# Patient Record
Sex: FEMALE | Race: WHITE
[De-identification: names, ages, dates, MRNs, and addresses within clinical notes are randomized per-mention and may not be internally consistent; named-entity substitution may affect disease eponyms.]

---

## 2018-01-09 ENCOUNTER — HOSPITAL ENCOUNTER (EMERGENCY)
Dept: HOSPITAL 89 - ER | Age: 58
Discharge: HOME | End: 2018-01-09
Payer: COMMERCIAL

## 2018-01-09 VITALS — SYSTOLIC BLOOD PRESSURE: 118 MMHG | DIASTOLIC BLOOD PRESSURE: 68 MMHG

## 2018-01-09 VITALS — HEIGHT: 63 IN | WEIGHT: 145 LBS | BODY MASS INDEX: 25.69 KG/M2

## 2018-01-09 DIAGNOSIS — R09.02: ICD-10-CM

## 2018-01-09 DIAGNOSIS — J18.9: Primary | ICD-10-CM

## 2018-01-09 LAB — PLATELET COUNT, AUTOMATED: 413 K/UL (ref 150–450)

## 2018-01-09 PROCEDURE — 82947 ASSAY GLUCOSE BLOOD QUANT: CPT

## 2018-01-09 PROCEDURE — 36415 COLL VENOUS BLD VENIPUNCTURE: CPT

## 2018-01-09 PROCEDURE — 71275 CT ANGIOGRAPHY CHEST: CPT

## 2018-01-09 PROCEDURE — 93005 ELECTROCARDIOGRAM TRACING: CPT

## 2018-01-09 PROCEDURE — 84450 TRANSFERASE (AST) (SGOT): CPT

## 2018-01-09 PROCEDURE — 83880 ASSAY OF NATRIURETIC PEPTIDE: CPT

## 2018-01-09 PROCEDURE — 82565 ASSAY OF CREATININE: CPT

## 2018-01-09 PROCEDURE — 84295 ASSAY OF SERUM SODIUM: CPT

## 2018-01-09 PROCEDURE — 82247 BILIRUBIN TOTAL: CPT

## 2018-01-09 PROCEDURE — 85379 FIBRIN DEGRADATION QUANT: CPT

## 2018-01-09 PROCEDURE — 81001 URINALYSIS AUTO W/SCOPE: CPT

## 2018-01-09 PROCEDURE — 82374 ASSAY BLOOD CARBON DIOXIDE: CPT

## 2018-01-09 PROCEDURE — 82040 ASSAY OF SERUM ALBUMIN: CPT

## 2018-01-09 PROCEDURE — 94640 AIRWAY INHALATION TREATMENT: CPT

## 2018-01-09 PROCEDURE — 84484 ASSAY OF TROPONIN QUANT: CPT

## 2018-01-09 PROCEDURE — 85025 COMPLETE CBC W/AUTO DIFF WBC: CPT

## 2018-01-09 PROCEDURE — 84075 ASSAY ALKALINE PHOSPHATASE: CPT

## 2018-01-09 PROCEDURE — 87040 BLOOD CULTURE FOR BACTERIA: CPT

## 2018-01-09 PROCEDURE — 99284 EMERGENCY DEPT VISIT MOD MDM: CPT

## 2018-01-09 PROCEDURE — 84155 ASSAY OF PROTEIN SERUM: CPT

## 2018-01-09 PROCEDURE — 71046 X-RAY EXAM CHEST 2 VIEWS: CPT

## 2018-01-09 PROCEDURE — 82310 ASSAY OF CALCIUM: CPT

## 2018-01-09 PROCEDURE — 84132 ASSAY OF SERUM POTASSIUM: CPT

## 2018-01-09 PROCEDURE — 87502 INFLUENZA DNA AMP PROBE: CPT

## 2018-01-09 PROCEDURE — 82435 ASSAY OF BLOOD CHLORIDE: CPT

## 2018-01-09 PROCEDURE — 84460 ALANINE AMINO (ALT) (SGPT): CPT

## 2018-01-09 PROCEDURE — 84520 ASSAY OF UREA NITROGEN: CPT

## 2018-01-09 NOTE — ER REPORT
History and Physical


Time Seen By MD:  06:59


Hx. of Stated Complaint:  


PT HAS HAD ONSET OF DYSPNEA SINCE THE WEEKEND, NON PROD COUGH, NO RESP HX. PAIN 


IN "BRONCHIAL TUBES " WITH COUGH


HPI/ROS


CHIEF COMPLAINT: short of breath, hypoxia





HISTORY OF PRESENT ILLNESS: This is a 57 year old female. She has been having 

symptoms of shortness of breath for several days. Very fatigued as well. Has 

been using her sister's oxygen with her CPAP at night. When she takes of the 

CPAP, she feels worse, but while on the CPAP has felt better. Short of breath, 

especially with exertion. Has non-productive cough. Has pain in the upper chest

, what she describes as from her bronchial tubes. Worsens with cough. 

Subjective fevers. Mild sore throat and congestions and drainage. No other 

chest pain. No nausea or vomiting. No problems with bowel or bladder. Has some 

muscle aches. No rashes. No swelling in extremities at this time. Has had prior 

symptoms like this in the past requiring oxygen use, but workup was negative 

including stress tests, cardiac ultrasound. She has been seeing a pulmonologist 

since then.





REVIEW OF SYSTEMS:


As above.


Allergies:  


Coded Allergies:  


     Sulfa (Sulfonamide Antibiotics) (Verified  Adverse Reaction, Unknown, 

ITCHING, 1/9/18)


Home Meds


Active Scripts


Azithromycin (ZITHROMAX) 250 Mg Tablet, 2 TAB PO AS DIRECTED for 5 Days, #6 TAB 

0 Refills


   Take 2 tablets today and then one daily for 4 more days.


   Prov:CRISTIAN RAYMUNDO MD         1/9/18


Reported Medications


Amoxicillin/Pot Clav 875-125 Mg Tab (AUGMENTIN 875-125 TABLET) 1 Each Tablet, 1 

TAB PO Q12H, TAB


   1/9/18


Omeprazole (OMEPRAZOLE) 20 Mg Capsule.dr, 1 CAP PO QDAY, CAP


   1/9/18


Omega-3 Fatty Acids (FISH OIL) 500 Mg Capsule, 500 MG PO QDAY


   9/5/13


Multivitamin With Minerals (MULTIPLE VITAMIN) 1 Each Tablet, 1 EACH PO QDAY


   9/5/13


Past Medical/Surgical History


Obstructive sleep apnea, pulmonary history as noted above. Postmenopausal, 

orthopedic surgery on left great toe for bone spurs


Reviewed Nurses Notes:  Yes


Hx Substance Use Disorder:  No


Hx Alcohol Use:  Yes (10 WEEKLY)


Constitutional





Vital Sign - Last 24 Hours








 1/9/18 1/9/18 1/9/18 1/9/18





 06:43 06:43 06:45 07:00


 


Temp 97.5   


 


Pulse 115   


 


Resp 22   


 


B/P (MAP) 134/89 134/89 (104)  114/69 (84)


 


Pulse Ox 80   


 


O2 Delivery Room Air   


 


O2 Flow Rate   2.0 


 


    





 1/9/18 1/9/18 1/9/18 1/9/18





 07:10 07:11 07:11 07:16


 


Pulse 83  85 


 


Resp 22  16 


 


Pulse Ox 95 95  95


 


O2 Delivery  Nasal Cannula  Nasal Cannula


 


O2 Flow Rate  4.0  3.0





 1/9/18 1/9/18 1/9/18 1/9/18





 07:16 07:40 08:45 08:51


 


Pulse 98  77 


 


Resp 18 25  


 


B/P (MAP)    118/68 (85)


 


Pulse Ox   83 





 1/9/18 1/9/18 1/9/18 





 09:15 09:20 10:20 


 


Pulse 74 78 73 


 


Resp 24 20 24 


 


Pulse Ox 92 93 93 








Physical Exam


   General Appearance: The patient is alert. No acute distress. Non-toxic in 

appearance.


Eyes: Pupils are equal, round. No pallor, injection or icterus.


ENT: Mucous membranes are moist. Normal oral mucosa. Posterior oropharynx with 

some posterior drainage and mild erythema, but no exudates or hypertrophy. 

Normal tympanic membranes and canals.


Neck: Supple and non tender. No lymphadenopathy.


Respiratory: Lungs have course rhonchi, but no wheezing or rales. There are no 

retractions or accessory muscle use. hypoxia of 83% on room air, improves into 

the 90s on 2 liters of oxygen.


Cardiovascular: Regular rate and rhythm. No murmurs, gallops or rubs. Normal 

capillary refill.


Gastrointestinal:  Abdomen is soft and non tender. No masses or organomegaly. 

Normal active bowel sounds. No costovertebral angle tenderness with percussion.


Neurological: Alert and oriented x3.


Skin: Warm and dry. No rashes.


Musculoskeletal: Nontender. Full range of motion.





DIFFERENTIAL DIAGNOSIS: After history and physical exam, differential diagnosis 

was considered for shortness of breath including but not limited to pulmonary 

infectious process, COPD, asthma, pulmonary embolus and congestive heart 

failure.





Medical Decision Making


Data Points


Result Diagram:  


1/9/18 0650                                                                    

            1/9/18 0650





Laboratory





Hematology








Test


  1/9/18


06:50 1/9/18


07:20 1/9/18


07:58


 


Red Blood Count


  4.88 M/uL


(4.17-5.56) 


  


 


 


Mean Corpuscular Volume


  88.7 fL


(80.0-96.0) 


  


 


 


Mean Corpuscular Hemoglobin


  30.2 pg


(26.0-33.0) 


  


 


 


Mean Corpuscular Hemoglobin


Concent 34.0 g/dL


(32.0-36.0) 


  


 


 


Red Cell Distribution Width


  13.5 %


(11.5-14.5) 


  


 


 


Mean Platelet Volume


  7.3 fL


(7.2-11.1) 


  


 


 


Neutrophils (%) (Auto)


  68.2 %


(39.4-72.5) 


  


 


 


Lymphocytes (%) (Auto)


  23.2 %


(17.6-49.6) 


  


 


 


Monocytes (%) (Auto)


  6.4 %


(4.1-12.4) 


  


 


 


Eosinophils (%) (Auto)


  1.2 %


(0.4-6.7) 


  


 


 


Basophils (%) (Auto)


  1.0 %


(0.3-1.4) 


  


 


 


Nucleated RBC Relative Count


(auto) 0.0 /100WBC 


  


  


 


 


Neutrophils # (Auto)


  9.6 K/uL


(2.0-7.4) 


  


 


 


Lymphocytes # (Auto)


  3.3 K/uL


(1.3-3.6) 


  


 


 


Monocytes # (Auto)


  0.9 K/uL


(0.3-1.0) 


  


 


 


Eosinophils # (Auto)


  0.2 K/uL


(0.0-0.5) 


  


 


 


Basophils # (Auto)


  0.1 K/uL


(0.0-0.1) 


  


 


 


Nucleated RBC Absolute Count


(auto) 0.00 K/uL 


  


  


 


 


D-Dimer Quantitative (PE/DVT)


  5.68 ug/ml


(0-0.50) 


  


 


 


Sodium Level


  134 mmol/L


(137-145) 


  


 


 


Potassium Level


  3.7 mmol/L


(3.5-5.0) 


  


 


 


Chloride Level


  94 mmol/L


() 


  


 


 


Carbon Dioxide Level


  30 mmol/L


(22-31) 


  


 


 


Blood Urea Nitrogen


  10 mg/dl


(7-18) 


  


 


 


Creatinine


  0.80 mg/dl


(0.52-1.04) 


  


 


 


Glomerular Filtration Rate


Calc > 60.0 


  


  


 


 


Random Glucose


  109 mg/dl


() 


  


 


 


Calcium Level


  9.9 mg/dl


(8.4-10.2) 


  


 


 


Total Bilirubin


  0.7 mg/dl


(0.2-1.3) 


  


 


 


Aspartate Amino Transf


(AST/SGOT) 36 U/L (0-35) 


  


  


 


 


Alanine Aminotransferase


(ALT/SGPT) 34 U/L (0-56) 


  


  


 


 


Alkaline Phosphatase


  122 U/L


(0-126) 


  


 


 


Troponin I < 0.012 ng/ml   


 


B-Type Natriuretic Peptide


  35 pg/ml


(0-100) 


  


 


 


Total Protein


  8.5 gm/dl


(6.3-8.2) 


  


 


 


Albumin


  4.3 g/dl


(3.5-5.0) 


  


 


 


Influenza Type A Antigen


  


  Negative


(NEGATIVE) 


 


 


Influenza Type B Antigen


  


  Negative


(NEGATIVE) 


 


 


Urine Color   Straw 


 


Urine Clarity   Clear 


 


Urine pH


  


  


  7.0 pH


(4.8-9.5)


 


Urine Specific Gravity   1.004 


 


Urine Protein


  


  


  Negative mg/dL


(NEGATIVE)


 


Urine Glucose (UA)


  


  


  Negative mg/dL


(NEGATIVE)


 


Urine Ketones


  


  


  Negative mg/dL


(NEGATIVE)


 


Urine Blood


  


  


  Negative


(NEGATIVE)


 


Urine Nitrite


  


  


  Negative


(NEGATIVE)


 


Urine Bilirubin


  


  


  Negative


(NEGATIVE)


 


Urine Urobilinogen


  


  


  Negative mg/dL


(0.2-1.9)


 


Urine Leukocyte Esterase


  


  


  Negative


(NEGATIVE)


 


Urine RBC


  


  


  None /HPF


(0-2/HPF)


 


Urine WBC


  


  


  None /HPF


(0-5/HPF)


 


Urine Squamous Epithelial


Cells 


  


  Moderate /LPF


(</=FEW)


 


Urine Bacteria


  


  


  Negative /HPF


(NONE-FEW)


 


Urine Mucus


  


  


  None /HPF


(NONE-FEW)








Chemistry








Test


  1/9/18


06:50 1/9/18


07:20 1/9/18


07:58


 


White Blood Count


  14.1 k/uL


(4.5-11.0) 


  


 


 


Red Blood Count


  4.88 M/uL


(4.17-5.56) 


  


 


 


Hemoglobin


  14.7 g/dL


(12.0-16.0) 


  


 


 


Hematocrit


  43.2 %


(34.0-47.0) 


  


 


 


Mean Corpuscular Volume


  88.7 fL


(80.0-96.0) 


  


 


 


Mean Corpuscular Hemoglobin


  30.2 pg


(26.0-33.0) 


  


 


 


Mean Corpuscular Hemoglobin


Concent 34.0 g/dL


(32.0-36.0) 


  


 


 


Red Cell Distribution Width


  13.5 %


(11.5-14.5) 


  


 


 


Platelet Count


  413 K/uL


(150-450) 


  


 


 


Mean Platelet Volume


  7.3 fL


(7.2-11.1) 


  


 


 


Neutrophils (%) (Auto)


  68.2 %


(39.4-72.5) 


  


 


 


Lymphocytes (%) (Auto)


  23.2 %


(17.6-49.6) 


  


 


 


Monocytes (%) (Auto)


  6.4 %


(4.1-12.4) 


  


 


 


Eosinophils (%) (Auto)


  1.2 %


(0.4-6.7) 


  


 


 


Basophils (%) (Auto)


  1.0 %


(0.3-1.4) 


  


 


 


Nucleated RBC Relative Count


(auto) 0.0 /100WBC 


  


  


 


 


Neutrophils # (Auto)


  9.6 K/uL


(2.0-7.4) 


  


 


 


Lymphocytes # (Auto)


  3.3 K/uL


(1.3-3.6) 


  


 


 


Monocytes # (Auto)


  0.9 K/uL


(0.3-1.0) 


  


 


 


Eosinophils # (Auto)


  0.2 K/uL


(0.0-0.5) 


  


 


 


Basophils # (Auto)


  0.1 K/uL


(0.0-0.1) 


  


 


 


Nucleated RBC Absolute Count


(auto) 0.00 K/uL 


  


  


 


 


D-Dimer Quantitative (PE/DVT)


  5.68 ug/ml


(0-0.50) 


  


 


 


Glomerular Filtration Rate


Calc > 60.0 


  


  


 


 


Calcium Level


  9.9 mg/dl


(8.4-10.2) 


  


 


 


Total Bilirubin


  0.7 mg/dl


(0.2-1.3) 


  


 


 


Aspartate Amino Transf


(AST/SGOT) 36 U/L (0-35) 


  


  


 


 


Alanine Aminotransferase


(ALT/SGPT) 34 U/L (0-56) 


  


  


 


 


Alkaline Phosphatase


  122 U/L


(0-126) 


  


 


 


Troponin I < 0.012 ng/ml   


 


B-Type Natriuretic Peptide


  35 pg/ml


(0-100) 


  


 


 


Total Protein


  8.5 gm/dl


(6.3-8.2) 


  


 


 


Albumin


  4.3 g/dl


(3.5-5.0) 


  


 


 


Influenza Type A Antigen


  


  Negative


(NEGATIVE) 


 


 


Influenza Type B Antigen


  


  Negative


(NEGATIVE) 


 


 


Urine Color   Straw 


 


Urine Clarity   Clear 


 


Urine pH


  


  


  7.0 pH


(4.8-9.5)


 


Urine Specific Gravity   1.004 


 


Urine Protein


  


  


  Negative mg/dL


(NEGATIVE)


 


Urine Glucose (UA)


  


  


  Negative mg/dL


(NEGATIVE)


 


Urine Ketones


  


  


  Negative mg/dL


(NEGATIVE)


 


Urine Blood


  


  


  Negative


(NEGATIVE)


 


Urine Nitrite


  


  


  Negative


(NEGATIVE)


 


Urine Bilirubin


  


  


  Negative


(NEGATIVE)


 


Urine Urobilinogen


  


  


  Negative mg/dL


(0.2-1.9)


 


Urine Leukocyte Esterase


  


  


  Negative


(NEGATIVE)


 


Urine RBC


  


  


  None /HPF


(0-2/HPF)


 


Urine WBC


  


  


  None /HPF


(0-5/HPF)


 


Urine Squamous Epithelial


Cells 


  


  Moderate /LPF


(</=FEW)


 


Urine Bacteria


  


  


  Negative /HPF


(NONE-FEW)


 


Urine Mucus


  


  


  None /HPF


(NONE-FEW)








Coagulation








Test


  1/9/18


06:50


 


D-Dimer Quantitative (PE/DVT) 5.68 ug/ml 








Urinalysis








Test


  1/9/18


07:58


 


Urine Color Straw 


 


Urine Clarity Clear 


 


Urine pH


  7.0 pH


(4.8-9.5)


 


Urine Specific Gravity 1.004 


 


Urine Protein


  Negative mg/dL


(NEGATIVE)


 


Urine Glucose (UA)


  Negative mg/dL


(NEGATIVE)


 


Urine Ketones


  Negative mg/dL


(NEGATIVE)


 


Urine Blood


  Negative


(NEGATIVE)


 


Urine Nitrite


  Negative


(NEGATIVE)


 


Urine Bilirubin


  Negative


(NEGATIVE)


 


Urine Urobilinogen


  Negative mg/dL


(0.2-1.9)


 


Urine Leukocyte Esterase


  Negative


(NEGATIVE)


 


Urine RBC


  None /HPF


(0-2/HPF)


 


Urine WBC


  None /HPF


(0-5/HPF)


 


Urine Squamous Epithelial


Cells Moderate /LPF


(</=FEW)


 


Urine Bacteria


  Negative /HPF


(NONE-FEW)


 


Urine Mucus


  None /HPF


(NONE-FEW)








Microbiology





Microbiology








 Date/Time


Source Procedure


Growth Status


 


 


 1/9/18 07:37


Blood Blood Culture - Preliminary


NO GROWTH SO FAR, SET LATE. REINCUBATED Resulted


 


 1/9/18 07:37


Blood Blood Culture - Preliminary


NO GROWTH SO FAR, SET LATE. REINCUBATED Resulted











EKG/Imaging


EKG Interpretation


12 lead EKG:


      Rhythm: normal sinus rhythm


      Axis: normal 


      QRS: normal


      ST segments: normal


Imaging


EXAMINATION: PA and Lateral Chest 1/9/2018 7:08 AM


 


HISTORY: RESP DISTRESS.  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 

with CPAP usage.


 


COMPARISON: CT 3/9/2015, chest x-ray 3/3/2015


 


FINDINGS:


Cardiomediastinal contours: Normal


Lungs and pleura: Minimal linear subsegmental atelectasis in lateral lower lung 

field on the left.  No significant acute infiltrate.  Pleural spaces are clear.


Bones/soft tissues: Right hemidiaphragm is not significantly elevated.  

Scoliotic spinal curvature.  Minimal degenerative spurring along the spine.


 


IMPRESSION: No significant acute cardiopulmonary abnormality.


 


Report Dictated By: Polo El MD at 1/9/2018 8:24 AM








EXAMINATION: CTA Chest With Contrast   1/9/2018 8:17 AM


 


HISTORY:  ELEVATED D-DIMER


 


TECHNIQUE:   Pulmonary embolus protocol - Thin-slice axial imaging of the chest 

was performed during maximal pulmonary arterial opacification with intravenous 

nonionic iodinated contrast. 3D coronal slab MIPs and 2D reconstructions in the 

coronal and sagittal planes were performed to aid in pulmonary embolus 

detection. Representative images have been stored on PACS.


Contrast:   75 mL of IV Isovue 370.


One of the following dose optimization techniques was utilized in the 

performance of this exam: Automated exposure control; adjustment of the mA and/

or kV according to the patient's size; or use of an iterative  reconstruction 

technique.  Specific details can be referenced in the facility's radiology CT 

exam operational policy.


 


COMPARISON STUDIES:   Chest x-ray today.  CT 3/9/2015.


 


FINDINGS:


Angiographic Findings:


Pulmonary arteries: There are no filling defects in the main, right, left, lobar

, segmental or visualized sub-segmental branches of the pulmonary arterial 

system


 


Other vasculature:   negative


 


Additional non-angiographic findings:


Lungs / pleura: Subtle diffuse groundglass haziness in the lungs is probably 

reflective of the stop breathing CTA technique.  Dependent lower lobe markings 

bilaterally are probably simply atelectasis.  There are small pleural-based 

foci of infiltrate or opacity laterally in the right upper lobe (axial images 

25 through 28) and some subtle patchy groundglass density laterally in the left 

upper lobe most notably axial image 37 and 38).


Mediastinum / lynda: negative


Heart / pericardium: negative


 


Musculoskeletal / Body wall: Old lateral right third rib injury.  Mild 

dextroscoliotic curvature.


 


Lymph node assessment: negative


 


Lower neck: negative


Upper abdomen: Incidental small accessory splenules under the lateral left 

hemidiaphragm.


 


IMPRESSION:


1.  Negative CTA evaluation for PE.


2.  Small peripheral or pleural-based densities in the lateral upper lobes 

bilaterally.  This may simply be areas of atelectasis although infectious or 

inflammatory infiltrate would be possible.  Dependent bilateral lower lobe 

markings are more likely simply atelectasis.


 


Report Dictated By: Polo El MD at 1/9/2018 9:06 AM





ED Course/Re-evaluation


Clinical Indication for ER IV:  IV Access


ED Course


Labs were done and showed a mild increased white count. Imaging with some 

changes as noted on CT scan, but without PE. Question if this is early 

bacterial community acquired pneumonia, versus a viral pulmonary process. 

Unable to determine fully from labs and imaging. She feels much better with the 

oxygen. Troponin and EKG are normal. Recommended home oxygen. Continue 

Augmenting that she has been on and has taken 3 doses of now. Add Azithromycin. 

Recommended follow-up with pulmonology.


Decision to Disposition Date:  Jan 9, 2018


Decision to Disposition Time:  10:48





Depart


Departure


Latest Vital Signs





Vital Signs








  Date Time  Temp Pulse Resp B/P (MAP) Pulse Ox O2 Delivery O2 Flow Rate FiO2


 


1/9/18 10:20  73 24  93   


 


1/9/18 08:51    118/68 (85)    


 


1/9/18 07:16      Nasal Cannula 3.0 


 


1/9/18 06:43 97.5       








Impression:  


 Primary Impression:  


 Community acquired pneumonia


 Additional Impression:  


 Hypoxia


Condition:  Improved


Disposition:  HOME OR SELF-CARE


Referrals:  


JAKE FRANCES MD (PCP)


New Scripts


Azithromycin (ZITHROMAX) 250 Mg Tablet


2 TAB PO AS DIRECTED for 5 Days, #6 TAB 0 Refills


   Take 2 tablets today and then one daily for 4 more days.


   Prov: CRISTIAN RAYMUNDO MD         1/9/18


Departure Forms:  Home Oxygen, Nebulizer RX      Durable Medical Equipment-

Oxygen:  Oxygen Concentrator, Portable Oxygen Gas


   Reason for Use/Diagnosis:  pneumonia, hypoxia


   Start Date of the Order:  Jan 9, 2018


   Dosage or Concentration (if applicable) - LPM:  2


   Route of Administration (if applicable):  Nasal Cannula


   Frequency of Use:  Continuous


   Duration Home O2 Required:  4


   Duration Units:  Weeks


   Room Air Oxygen Saturation:  83


   ER Prescribing Physician's Name:  Cristian Raymundo


   NPI Numbers for Local ER MDs:  Zackary [id number]


Patient Instructions:  Community Acquired Pneumonia (ED), Hypoxia (ED)





Additional Instructions:  


We were not able to determine the exact cause of your low oxygen and shortness 

of breath.


We feel that this is a pulmonary infectious process, but could be viral or 

bacterial.


We are going to treat based on likely early bacterial pneumonia. 


Keep taking you Augmenting till completion.


Start Azithromycin 250mg tablets. Take 2 tablets today, then one tablet daily 

for 4 more days.


Call to arrange a follow-up visit with your pulmonologist.


Start continuous oxygen until you are better and no longer needed.





Problem Qualifiers








 Primary Impression:  


 Community acquired pneumonia


 Laterality:  unspecified laterality  Qualified Codes:  J18.9 - Pneumonia, 

unspecified organism








CRISTIAN RAYMUNDO MD Jan 9, 2018 07:00

## 2018-01-09 NOTE — RADIOLOGY IMAGING REPORT
FACILITY: [facility] [place] 

 

PATIENT NAME: Pricilla Bryan

: 1960

MR: [id number]

V: [id number]

EXAM DATE: 

ORDERING PHYSICIAN: PANCHO GARZA

TECHNOLOGIST: 

 

Location: [facility] [place]

Patient: Pricilla Bryan

: 1960

MRN: [id number]

Visit/Account:[id number]

Date of Sevice:  2018

 

ACCESSION #: [id number]

 

EXAMINATION: CTA Chest With Contrast   2018 8:17 AM

 

HISTORY:  ELEVATED D-DIMER

 

TECHNIQUE:   Pulmonary embolus protocol - Thin-slice axial imaging of the chest was performed during 
maximal pulmonary arterial opacification with intravenous nonionic iodinated contrast. 3D coronal sla
b MIPs and 2D reconstructions in the coronal and sagittal planes were performed to aid in pulmonary e
mbolus detection. Representative images have been stored on PACS.

Contrast:   75 mL of IV Isovue 370.

One of the following dose optimization techniques was utilized in the performance of this exam: Autom
ated exposure control; adjustment of the mA and/or kV according to the patient's size; or use of an i
terative  reconstruction technique.  Specific details can be referenced in the facility's radiology C
T exam operational policy.

 

COMPARISON STUDIES:   Chest x-ray today.  CT 3/9/2015.

 

FINDINGS:

Angiographic Findings:

Pulmonary arteries: There are no filling defects in the main, right, left, lobar, segmental or visual
ized sub-segmental branches of the pulmonary arterial system

 

Other vasculature:   negative

 

Additional non-angiographic findings:

Lungs / pleura: Subtle diffuse groundglass haziness in the lungs is probably reflective of the stop b
reathing CTA technique.  Dependent lower lobe markings bilaterally are probably simply atelectasis.  
There are small pleural-based foci of infiltrate or opacity laterally in the right upper lobe (axial 
images 25 through 28) and some subtle patchy groundglass density laterally in the left upper lobe mos
t notably axial image 37 and 38).

Mediastinum / lynda: negative

Heart / pericardium: negative

 

Musculoskeletal / Body wall: Old lateral right third rib injury.  Mild dextroscoliotic curvature.

 

Lymph node assessment: negative

 

Lower neck: negative

Upper abdomen: Incidental small accessory splenules under the lateral left hemidiaphragm.

 

IMPRESSION:

1.  Negative CTA evaluation for PE.

2.  Small peripheral or pleural-based densities in the lateral upper lobes bilaterally.  This may sim
ply be areas of atelectasis although infectious or inflammatory infiltrate would be possible.  Depend
ent bilateral lower lobe markings are more likely simply atelectasis.

 

Report Dictated By: Polo El MD at 2018 9:06 AM

 

Report E-Signed By: Polo El MD  at 2018 9:14 AM

 

WSN:[id number]

## 2018-01-09 NOTE — EKG
FACILITY: [facility] 

 

PATIENT NAME: JUANITA FRANCO

: [id number]

MR: [id number]

V: [id number]

EXAM DATE: 

ORDERING PHYSICIAN: PANCHO GARZA

TECHNOLOGIST: BITNER

 

Test Reason : RESPIRATORY

Blood Pressure : ***/*** mmHG

Vent. Rate : 086 BPM     Atrial Rate : 086 BPM

   P-R Int : 142 ms          QRS Dur : 084 ms

    QT Int : 376 ms       P-R-T Axes : 055 029 025 degrees

   QTc Int : 449 ms

 

Normal sinus rhythm

Normal ECG

No previous ECGs available

Confirmed by RAFAEL CORBIN ([id number]) on 1/10/2018 6:28:37 AM

 

Referred By:             Confirmed By:RAFAEL CORBIN

## 2018-01-09 NOTE — RADIOLOGY IMAGING REPORT
[place] [facility] [place] 

 

PATIENT NAME: Pricilla Bryan

: 1960

MR: [id number]

V: [id number]

EXAM DATE: 

ORDERING PHYSICIAN: PANCHO GARZA

TECHNOLOGIST: 

 

Location: [facility]

Patient: Pricilla Bryan

: 1960

MRN: [id number]

Visit/Account:[id number]

Date of Sevice:  2018

 

ACCESSION #: [id number]

 

EXAMINATION: PA and Lateral Chest 2018 7:08 AM

 

HISTORY: RESP DISTRESS.  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

 

COMPARISON: CT 3/9/2015, chest x-ray 3/3/2015

 

FINDINGS:

Cardiomediastinal contours: Normal

Lungs and pleura: Minimal linear subsegmental atelectasis in lateral lower lung field on the left.  N
o significant acute infiltrate.  Pleural spaces are clear.

Bones/soft tissues: Right hemidiaphragm is not significantly elevated.  Scoliotic spinal curvature.  
Minimal degenerative spurring along the spine.

 

 

IMPRESSION: No significant acute cardiopulmonary abnormality.

 

Report Dictated By: Polo El MD at 2018 8:24 AM

 

Report E-Signed By: Polo El MD  at 2018 8:27 AM

 

WSN:[id number]

## 2018-01-15 ENCOUNTER — HOSPITAL ENCOUNTER (OUTPATIENT)
Dept: HOSPITAL 89 - RESP | Age: 58
End: 2018-01-15
Attending: INTERNAL MEDICINE
Payer: COMMERCIAL

## 2018-01-15 DIAGNOSIS — J96.01: Primary | ICD-10-CM

## 2018-01-15 PROCEDURE — 94729 DIFFUSING CAPACITY: CPT

## 2018-01-15 PROCEDURE — 94726 PLETHYSMOGRAPHY LUNG VOLUMES: CPT

## 2018-01-15 PROCEDURE — 94060 EVALUATION OF WHEEZING: CPT

## 2018-01-25 ENCOUNTER — HOSPITAL ENCOUNTER (OUTPATIENT)
Dept: HOSPITAL 89 - RAD | Age: 58
End: 2018-01-25
Attending: SURGERY
Payer: COMMERCIAL

## 2018-01-25 DIAGNOSIS — K21.9: Primary | ICD-10-CM

## 2018-01-25 PROCEDURE — 74247: CPT

## 2018-01-25 NOTE — RADIOLOGY IMAGING REPORT
FACILITY: [facility] [place] 

 

PATIENT NAME: Pricilla Bryan

: 1960

MR: [id number]

V: [id number]

EXAM DATE: 

ORDERING PHYSICIAN: TIMOTHY ALEXANDER

TECHNOLOGIST: 

 

Location: [facility]

Patient: Pricilla Bryan

: 1960

MRN: [id number]

Visit/Account:[id number]

Date of Sevice:  2018

 

ACCESSION #: [id number]

 

Exam type: UPPER GI SERIES W/AIR W/KUB

 

History: Heartburn epigastric pain worse while lying down

 

Comparison: None.

 

Findings:

 

Double contrast esophagram was performed with thick and thin barium.  A very large amount of gastroes
ophageal reflux was observed although there was no evidence of esophageal narrowing or mucosal erosio
n.  No abnormality of the stomach duodenal bulb or duodenal C-loop was seen.  The fluoroscopy dose ar
ea product was 303.47 micro-Gray per meter squared

 

IMPRESSION:

 

1.  Large amount of gastroesophageal reflux although no evidence of esophageal narrowing or mucosal e
rosion.

 

Report Dictated By: Elisa Gannon MD at 2018 5:08 PM

 

Report E-Signed By: Elisa Gannon MD  at 2018 5:11 PM

 

WSN:[email or web address]

## 2018-03-16 ENCOUNTER — HOSPITAL ENCOUNTER (OUTPATIENT)
Dept: HOSPITAL 89 - ZZSENDIN | Age: 58
End: 2018-03-16
Attending: OTOLARYNGOLOGY
Payer: COMMERCIAL

## 2018-03-16 DIAGNOSIS — K21.9: Primary | ICD-10-CM

## 2018-03-16 PROCEDURE — 87338 HPYLORI STOOL AG IA: CPT
